# Patient Record
Sex: FEMALE | Race: WHITE | NOT HISPANIC OR LATINO | Employment: FULL TIME | URBAN - METROPOLITAN AREA
[De-identification: names, ages, dates, MRNs, and addresses within clinical notes are randomized per-mention and may not be internally consistent; named-entity substitution may affect disease eponyms.]

---

## 2020-07-16 ENCOUNTER — LAB VISIT (OUTPATIENT)
Dept: PRIMARY CARE CLINIC | Facility: CLINIC | Age: 45
End: 2020-07-16

## 2020-07-16 DIAGNOSIS — Z00.6 RESEARCH STUDY PATIENT: ICD-10-CM

## 2020-07-16 DIAGNOSIS — Z00.6 RESEARCH STUDY PATIENT: Primary | ICD-10-CM

## 2020-07-16 PROCEDURE — U0003 INFECTIOUS AGENT DETECTION BY NUCLEIC ACID (DNA OR RNA); SEVERE ACUTE RESPIRATORY SYNDROME CORONAVIRUS 2 (SARS-COV-2) (CORONAVIRUS DISEASE [COVID-19]), AMPLIFIED PROBE TECHNIQUE, MAKING USE OF HIGH THROUGHPUT TECHNOLOGIES AS DESCRIBED BY CMS-2020-01-R: HCPCS

## 2020-07-16 PROCEDURE — 86769 SARS-COV-2 COVID-19 ANTIBODY: CPT

## 2020-07-16 NOTE — RESEARCH
Date of Consent: 7/16/2020    Sponsor: [facility]    Study Title/IRB Number: Observational study of Sars-CoV2 Immunoglobulin G (IgG) seroprevalence among the [facility] population over time 2020.163  Principle Investigator: Karlie Cabrera, PhD    Did the patient need translation services? No   name: N/A    Prior to the Informed Consent (IC) being signed, or any study protocol required data collection, testing, procedure, or intervention being performed, the following was done and/or discussed:   Patient was given a paper copy of the IC for review    Patient was given study FAQ   Purpose of the study and qualifications to participate    Study design and tests or procedures done at this visit   Confidentiality and HIPAA Authorization for Release of Medical Records for the research trial/ subject's rights/research related injury   Risk, Benefits, Alternative Treatments, Compensation and Costs   Participation in the research trial is voluntary and patient may withdraw at anytime   Contact information for study related questions    Patient verbalizes understanding of the above: Yes  Contact information for PI and IRB given to patient: Yes  Patient able to adequately summarize: the purpose of the study, the risks associated with the study, and all procedures, testing, and follow-ups associated with the study: Yes    The consent was discussed verbally with the patient and all questions were answered satisfactorily. Patient gave verbal consent for the Seroprevalence research study with an IRB approval date of 05/08/2020.      The Consent, Consent Witness and name of Clinical Research Coordinator consenting was captured and documented in REDCap.    All Inclusion and Exclusion Criteria reviewed, subject meets all Inclusion criteria and does not meet any Exclusion Criteria at this time.     Patient Eligibility was confirmed.    Patient responded to survey questions.    The following biospecimen  collection procedures were collected:    -Nasopharyngeal Swab Collection  -Blood collection

## 2020-07-17 LAB — SARS-COV-2 IGG SERPLBLD QL IA.RAPID: NEGATIVE

## 2020-07-19 LAB — SARS-COV-2 RNA RESP QL NAA+PROBE: NOT DETECTED

## 2023-02-22 PROBLEM — N60.82 FLAT EPITHELIAL ATYPIA (FEA) OF LEFT BREAST: Status: ACTIVE | Noted: 2023-02-22

## 2023-02-22 PROBLEM — Z80.3 FAMILY HISTORY OF MALIGNANT NEOPLASM OF BREAST: Status: ACTIVE | Noted: 2023-02-22

## 2023-02-22 PROBLEM — N60.92 ATYPICAL LOBULAR HYPERPLASIA (ALH) OF LEFT BREAST: Status: ACTIVE | Noted: 2023-02-22

## 2023-02-23 NOTE — PROGRESS NOTES
[facility]  MD Bill Morel, NP-C    Chief Complaint:   Velma Elena is a 48 y.o. female presenting today for her 6 month follow up as part of our High-Risk Clinic.. She is due for Mammogram.  She reports no interval changes.     History of Present Illness:   Mrs. Elena first presented on July 15, 2021 after her mammogram demonstrated an area of suspicious microcalcifications in the left breast. Stereotactic biopsy showed flat epithelial atypia and excision was recommended. Excisonal biopsy showed atypical lobular hyperplasia but nothing cancerous. MD::: Mercedes Renee MD.    Past Medical History:   Diagnosis Date    Atypical lobular hyperplasia (ALH) of left breast 2023    Long discussion with the patient concerning her diagnosis of Atypical Lobular Hyperplasia and the risk that it carries over her lifetime. Currently, the risk of developing a breast cancer is thought to be as high as 7% over the first 5 years. She understands the need to be followed more aggressively in our [facility] every 6 months and is comfortable with this plan.    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  Researchers are still working to understand if there is a true connection between FEA and breast cancer.  More research is needed.      Past Surgical History:   Procedure Laterality Date    BREAST BIOPSY Left     atypical lobular hyperplasia      SECTION      ENDOMETRIAL ABLATION  2022        Current Outpatient Medications:     ALPRAZolam (XANAX) 0.25 MG tablet, 1/2 by mouth three times daily as needed for anxiety, Disp: , Rfl:     pantoprazole (PROTONIX) 40 MG tablet, Take 40 mg by mouth every morning., Disp: , Rfl:     sertraline (ZOLOFT) 50 MG tablet, 1/2 by mouth daily for 2 weeks then 1daily by  mouth, Disp: , Rfl:    Review of patient's allergies indicates:   Allergen Reactions    Ranitidine hcl      NOT EFFECTIVE    Sulfa (sulfonamide antibiotics)      As a baby      Social History     Tobacco Use    Smoking status: Never    Smokeless tobacco: Never   Substance Use Topics    Alcohol use: Not on file      Family History   Problem Relation Age of Onset    Breast cancer Maternal Aunt     Breast cancer Maternal Grandmother       Review of Systems   Genitourinary:  Negative for hot flashes.   Integumentary:  Negative for color change, rash, mole/lesion, breast mass, breast discharge and breast tenderness.   Breast: Negative for mass and tenderness   Physical Exam  Chest:   Breasts:     Right: No mass, nipple discharge, skin change or tenderness.      Left: No mass, nipple discharge, skin change or tenderness.   Lymphadenopathy:      Upper Body:      Right upper body: No axillary adenopathy.      Left upper body: No axillary adenopathy.        MAMMOGRAM REPORT: She has some diffuse fibronodular tissue bilaterally; there are no spiculated lesions, distortions or suspicious calcifications noted; NEM     1. Atypical lobular hyperplasia (ALH) of left breast  Overview:  Long discussion with the patient concerning her diagnosis of Atypical Lobular Hyperplasia and the risk that it carries over her lifetime. Currently, the risk of developing a breast cancer is thought to be as high as 7% over the first 5 years. She understands the need to be followed more aggressively in our [facility] every 6 months and is comfortable with this plan.      2. 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 Although historically thought to be a high-risk lesion, this has become controversial.  Researchers are still working to understand if there is a true connection between FEA and breast cancer.  More  research is needed.          3. Family history of malignant neoplasm of breast  Overview:  We discussed her family history and how it could impact her own future risks.  We discussed family vs. genetic history and the importance of each.  Genetic Counseling/Testing was offered, and all questions answered.                 Medical Decision Making:  It is my impression that this patient suffers all conditions contained in this medical document.  Each of these conditions did affect our plan of care and my medical decision making today.  It is my opinion that the medical decision making concerning this patient was of minimal  difficulty based on the aforementioned conditions.  Any further recommendations will be communicated to the patient by me.  I have reviewed and verified her allergies, list of medications, medical and surgical histories, social history, and a pertinent review of symptoms.        Follow up:  6 months and prn    For:  PE and US with me

## 2023-03-06 ENCOUNTER — OFFICE VISIT (OUTPATIENT)
Dept: SURGERY | Facility: CLINIC | Age: 48
End: 2023-03-06
Payer: COMMERCIAL

## 2023-03-06 DIAGNOSIS — N60.82 FLAT EPITHELIAL ATYPIA (FEA) OF LEFT BREAST: ICD-10-CM

## 2023-03-06 DIAGNOSIS — N60.92 ATYPICAL LOBULAR HYPERPLASIA (ALH) OF LEFT BREAST: Primary | ICD-10-CM

## 2023-03-06 DIAGNOSIS — Z80.3 FAMILY HISTORY OF MALIGNANT NEOPLASM OF BREAST: ICD-10-CM

## 2023-03-06 PROCEDURE — 99214 PR OFFICE/OUTPT VISIT, EST, LEVL IV, 30-39 MIN: ICD-10-PCS | Mod: S$GLB,,, | Performed by: SPECIALIST

## 2023-03-06 PROCEDURE — 99214 OFFICE O/P EST MOD 30 MIN: CPT | Mod: S$GLB,,, | Performed by: SPECIALIST

## 2023-03-06 PROCEDURE — 1160F PR REVIEW ALL MEDS BY PRESCRIBER/CLIN PHARMACIST DOCUMENTED: ICD-10-PCS | Mod: CPTII,S$GLB,, | Performed by: SPECIALIST

## 2023-03-06 PROCEDURE — 1160F RVW MEDS BY RX/DR IN RCRD: CPT | Mod: CPTII,S$GLB,, | Performed by: SPECIALIST

## 2023-03-06 PROCEDURE — 1159F PR MEDICATION LIST DOCUMENTED IN MEDICAL RECORD: ICD-10-PCS | Mod: CPTII,S$GLB,, | Performed by: SPECIALIST

## 2023-03-06 PROCEDURE — 1159F MED LIST DOCD IN RCRD: CPT | Mod: CPTII,S$GLB,, | Performed by: SPECIALIST

## 2023-08-28 NOTE — ASSESSMENT & PLAN NOTE
Long discussion with the patient concerning her diagnosis of Flat Epithelial Atypia and the risk that it can carry over her lifetime - which can be up to 4 times the normal population risk (given historical data).  Due to this, we recommended that patients with FEA be seen twice per year in the high-risk setting.  While treating as a high-risk lesion is the current standard of care - this has become controversial as of recent.       Researchers are still working to understand if there is a true connection between FEA and breast cancer.  More research is needed before treating FEA as a benign entity as FEA can be found adjacent to more aggressive and cancerous lesions.

## 2023-08-28 NOTE — PROGRESS NOTES
[facility]  David Garcia MD, FACS  Bill Hung NP-C      Date of Service: 2023    Chief Complaint:   Velma Elena is a 48 y.o. female presenting today for her 6-month evaluation given her diagnosis of Atypical Lobular Hyperplasia. She is due for an ultrasound.  She reports no interval changes.     History of Present Illness:   Mrs. Elena first presented on July 15, 2021 after her mammogram demonstrated an area of suspicious microcalcifications in the left breast. Stereotactic biopsy showed flat epithelial atypia and excision was recommended. Excisonal biopsy showed atypical lobular hyperplasia but nothing cancerous. MD::: Mercedes Renee MD.    Past Medical History:   Diagnosis Date    Atypical lobular hyperplasia (ALH) of left breast 2023    Long discussion with the patient concerning her diagnosis of Atypical Lobular Hyperplasia and the risk that it carries over her lifetime. Currently, the risk of developing a breast cancer is thought to be as high as 7% over the first 5 years. She understands the need to be followed more aggressively in our [facility] every 6 months and is comfortable with this plan.    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  Researchers are still working to understand if there is a true connection between FEA and breast cancer.  More research is needed.      Past Surgical History:   Procedure Laterality Date    BREAST BIOPSY Left     atypical lobular hyperplasia      SECTION      ENDOMETRIAL ABLATION  2022        Current Outpatient Medications:     ALPRAZolam (XANAX) 0.25 MG tablet, 1/2 by mouth three times daily as needed for anxiety, Disp: , Rfl:     pantoprazole (PROTONIX) 40 MG tablet, Take 40 mg by mouth every morning., Disp: , Rfl:     sertraline  (ZOLOFT) 50 MG tablet, 1/2 by mouth daily for 2 weeks then 1daily by mouth, Disp: , Rfl:    Review of patient's allergies indicates:   Allergen Reactions    Ranitidine hcl      NOT EFFECTIVE    Sulfa (sulfonamide antibiotics)      As a baby      Social History     Tobacco Use    Smoking status: Never    Smokeless tobacco: Never   Substance Use Topics    Alcohol use: Not on file      Family History   Problem Relation Age of Onset    Breast cancer Maternal Grandmother     Breast cancer Mother     Breast cancer Maternal Aunt         Review of Systems   Integumentary:  Negative for color change, rash, mole/lesion, breast mass, breast discharge and breast tenderness.   Breast: Negative for mass and tenderness       Physical Exam   HENT:   Head: Normocephalic.   Pulmonary/Chest: Right breast exhibits no inverted nipple, no mass, no nipple discharge, no skin change and no tenderness. Left breast exhibits no inverted nipple, no mass, no nipple discharge, no skin change and no tenderness. No breast swelling.   Genitourinary: No breast swelling.   Musculoskeletal: Lymphadenopathy:      Upper Body:      Right upper body: No supraclavicular or axillary adenopathy.      Left upper body: No supraclavicular or axillary adenopathy.     Neurological: She is alert.        ULTRASOUND EVALUATION and REPORT    Bilateral real-time Ultrasound was performed by me.  All four quadrants of the breast, the subareolar and axillary basins were scanned.    She has some heterogeneous dense fibroglandular tissue bilaterally.    Right Breast: She has normal tissues in the right breast; there's no disruption of the tissue planes and no abnormal shadowing; she has normal skin thickness with no subcutaneous nodules of skin thickening; NEM     Left Breast: She has normal tissues in the left breast; there's no disruption of the tissue planes and no abnormal shadowing; she has normal skin thickness with no subcutaneous nodules or skin thickening; NEM      Axillae: There are no abnormal lymph nodes seen bilaterally.     Impression: Some dense but normal tissue bilaterally with no solid/suspicious masses noted. No LAD in bilateral axillae.  BIRADS: Category 2 - Benign Finding.    Findings were discussed with patient in real time and a hand written report was given to her at the conclusion of the exam.      ASSESSMENT and PLAN OF CARE     1. Atypical lobular hyperplasia (ALH) of left breast  Assessment & Plan:  Long discussion with the patient concerning her diagnosis of Atypical Lobular Hyperplasia and the risk that it carries over her lifetime. Currently, the risk of developing a breast cancer is thought to be as high as 7% over the first 5 years.  Lifetime risks are more difficult to determine, but she understands that her risk is higher than others without a diagnosis of atypia.  She understands the need to be followed more aggressively in our [facility] every 6 months and is comfortable with this plan.        2. 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  Due to this, we recommended that patients with FEA be seen twice per year in the high-risk setting.  While treating as a high-risk lesion is the current standard of care - this has become controversial as of recent.       Researchers are still working to understand if there is a true connection between FEA and breast cancer.  More research is needed before treating FEA as a benign entity as FEA can be found adjacent to more aggressive and cancerous lesions.        3. Family history of malignant neoplasm of breast  Assessment & Plan:  We discussed her family history and how it could impact her own future risks.  We discussed family vs. genetic history and the importance and implications  of each.  Genetic Counseling/Testing was offered, and all questions answered to her satisfaction.  She knows that as additional family members are diagnosed - she will need to let us know as this may change follow up and imaging recommendations.    We had a discussion concerning Breast Cancer Risk Reduction and current NCCN Guidelines. She knows that her risk can be lowered slightly with a healthy lifestyle and minimal ETOH use. Being physically active will also help. She should reduce or stay away from OCPs and HRT as possible.         Medical Decision Making: It is my impression that the patient suffers from all the listed conditions.  Each of these conditions did affect my Plan of Care and all medical decisions that were rendered.  The medical decision making was of high difficulty based on her comorbidities and her previous diagnosis of Atypical Lobular Hyperplasia, which puts her in a high-risk category for the future risk of breast cancer.  I have reviewed all imaging and it has been discussed with her. I have reviewed and verified her allergies, list of medications, medical and surgical histories, social history and a pertinent review of symptoms.    Follow up: 6 months and prn     For:  VAHID SAMUEL (GOLD[facility]) at [facility]

## 2023-08-28 NOTE — ASSESSMENT & PLAN NOTE
Long discussion with the patient concerning her diagnosis of Atypical Lobular Hyperplasia and the risk that it carries over her lifetime. Currently, the risk of developing a breast cancer is thought to be as high as 7% over the first 5 years.  Lifetime risks are more difficult to determine, but she understands that her risk is higher than others without a diagnosis of atypia.  She understands the need to be followed more aggressively in our [facility] every 6 months and is comfortable with this plan.

## 2023-09-11 ENCOUNTER — OFFICE VISIT (OUTPATIENT)
Dept: SURGERY | Facility: CLINIC | Age: 48
End: 2023-09-11
Payer: COMMERCIAL

## 2023-09-11 DIAGNOSIS — Z80.3 FAMILY HISTORY OF MALIGNANT NEOPLASM OF BREAST: ICD-10-CM

## 2023-09-11 DIAGNOSIS — N60.92 ATYPICAL LOBULAR HYPERPLASIA (ALH) OF LEFT BREAST: Primary | ICD-10-CM

## 2023-09-11 DIAGNOSIS — N60.82 FLAT EPITHELIAL ATYPIA (FEA) OF LEFT BREAST: ICD-10-CM

## 2023-09-11 PROCEDURE — 1160F PR REVIEW ALL MEDS BY PRESCRIBER/CLIN PHARMACIST DOCUMENTED: ICD-10-PCS | Mod: CPTII,S$GLB,, | Performed by: SPECIALIST

## 2023-09-11 PROCEDURE — 1159F MED LIST DOCD IN RCRD: CPT | Mod: CPTII,S$GLB,, | Performed by: SPECIALIST

## 2023-09-11 PROCEDURE — 99214 PR OFFICE/OUTPT VISIT, EST, LEVL IV, 30-39 MIN: ICD-10-PCS | Mod: S$GLB,,, | Performed by: SPECIALIST

## 2023-09-11 PROCEDURE — 1160F RVW MEDS BY RX/DR IN RCRD: CPT | Mod: CPTII,S$GLB,, | Performed by: SPECIALIST

## 2023-09-11 PROCEDURE — 99214 OFFICE O/P EST MOD 30 MIN: CPT | Mod: S$GLB,,, | Performed by: SPECIALIST

## 2023-09-11 PROCEDURE — 1159F PR MEDICATION LIST DOCUMENTED IN MEDICAL RECORD: ICD-10-PCS | Mod: CPTII,S$GLB,, | Performed by: SPECIALIST

## 2024-04-07 DIAGNOSIS — N60.82 FLAT EPITHELIAL ATYPIA (FEA) OF LEFT BREAST: ICD-10-CM

## 2024-04-07 DIAGNOSIS — N60.92 ATYPICAL LOBULAR HYPERPLASIA (ALH) OF LEFT BREAST: Primary | ICD-10-CM

## 2024-04-17 NOTE — PROGRESS NOTES
[facility]  David Garcia MD, FACS  Bill Hung NP-C      Date of Service: 2024    Chief Complaint:   Velma Elena is a 49 y.o. female presenting today for her 6 month evaluation given her diagnosis of Atypical Lobular Hyperplasia and Flat epithelial atypia.  She is due for her annual mammogram.  She reports no interval changes.     History of Present Illness:   Mrs. Elena first presented on July 15, 2021 after her mammogram demonstrated an area of suspicious microcalcifications in the left breast. Stereotactic biopsy showed flat epithelial atypia and excision was recommended. Excisonal biopsy showed atypical lobular hyperplasia but nothing cancerous. MD::: Mercedes Renee MD.       Past Medical History:   Diagnosis Date    Atypical lobular hyperplasia (ALH) of left breast 2023    Long discussion with the patient concerning her diagnosis of Atypical Lobular Hyperplasia and the risk that it carries over her lifetime. Currently, the risk of developing a breast cancer is thought to be as high as 7% over the first 5 years. She understands the need to be followed more aggressively in our [facility] every 6 months and is comfortable with this plan.    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  Researchers are still working to understand if there is a true connection between FEA and breast cancer.  More research is needed.      Past Surgical History:   Procedure Laterality Date    BREAST BIOPSY Left     atypical lobular hyperplasia      SECTION      ENDOMETRIAL ABLATION  2022        Current Outpatient Medications:     ALPRAZolam (XANAX) 0.25 MG tablet, 1/2 by mouth three times daily as needed for anxiety, Disp: , Rfl:     pantoprazole (PROTONIX) 40 MG tablet, Take 40 mg by mouth every  morning., Disp: , Rfl:     sertraline (ZOLOFT) 50 MG tablet, 1/2 by mouth daily for 2 weeks then 1daily by mouth, Disp: , Rfl:    Review of patient's allergies indicates:   Allergen Reactions    Ranitidine hcl      NOT EFFECTIVE    Sulfa (sulfonamide antibiotics)      As a baby      Social History     Tobacco Use    Smoking status: Never    Smokeless tobacco: Never   Substance Use Topics    Alcohol use: Not on file      Family History   Problem Relation Name Age of Onset    Breast cancer Maternal Grandmother      Breast cancer Mother      Breast cancer Maternal Aunt          Review of Systems   Integumentary:  Negative for color change, rash, mole/lesion, breast mass, breast discharge and breast tenderness.   Breast: Negative for mass and tenderness       Physical Exam   HENT:   Head: Normocephalic.   Pulmonary/Chest: Right breast exhibits no inverted nipple, no mass, no nipple discharge, no skin change and no tenderness. Left breast exhibits no inverted nipple, no mass, no nipple discharge, no skin change and no tenderness. No breast swelling.   Genitourinary: No breast swelling.   Musculoskeletal: Lymphadenopathy:      Upper Body:      Right upper body: No supraclavicular or axillary adenopathy.      Left upper body: No supraclavicular or axillary adenopathy.     Neurological: She is alert.        MAMMOGRAM REPORT: She has some diffuse fibronodular tissue; there are no spiculated lesions, distortions or suspicious calcifications noted; NEM    ASSESSMENT and PLAN OF CARE     1. Atypical lobular hyperplasia (ALH) of left breast  Assessment & Plan:  Long discussion with the patient concerning her diagnosis of Atypical Lobular Hyperplasia and the risk that it carries over her lifetime. Currently, the risk of developing a breast cancer is thought to be as high as 7% over the first 5 years.  Lifetime risks are more difficult to determine, but she understands that her risk is higher than others without a diagnosis of  atypia.  She understands the need to be followed more aggressively in our [facility] every 6 months and is comfortable with this plan.        2. 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  Due to this, we recommended that patients with FEA be seen twice per year in the high-risk setting.  While treating as a high-risk lesion is the current standard of care - this has become controversial as of recent.       Researchers are still working to understand if there is a true connection between FEA and breast cancer.  More research is needed before treating FEA as a benign entity as FEA can be found adjacent to more aggressive and cancerous lesions.        3. Family history of malignant neoplasm of breast  Assessment & Plan:  We discussed her family history and how it could impact her own future risks.  We discussed family vs. genetic history and the importance and implications of each.  All questions answered to her satisfaction.  She knows that as additional family members are diagnosed - she will need to let us know as this may change follow up and imaging recommendations.    We had a discussion concerning Breast Cancer Risk Reduction and current NCCN Guidelines. She knows that her risk can be lowered slightly with a healthy lifestyle and minimal ETOH use. Being physically active will also help. She should reduce or stay away from OCPs and HRT as possible.         Medical Decision Making: It is my impression that the patient suffers from all the listed conditions.  Each of these conditions did affect my Plan of Care and all medical decisions that were rendered.  The medical decision making was of high difficulty based on her comorbidities and her previous diagnosis of Atypical Ductal  Hyperplasia, which puts her in a high-risk category for the future risk of breast cancer.  I have reviewed all imaging and it has been discussed with her. I have reviewed and verified her allergies, list of medications, medical and surgical histories, social history and a pertinent review of symptoms.    Follow up:  6 months and prn    For:  PE and US with Dr. Garcia

## 2024-04-19 ENCOUNTER — OFFICE VISIT (OUTPATIENT)
Dept: SURGERY | Facility: CLINIC | Age: 49
End: 2024-04-19
Payer: COMMERCIAL

## 2024-04-19 DIAGNOSIS — N60.92 ATYPICAL LOBULAR HYPERPLASIA (ALH) OF LEFT BREAST: Primary | ICD-10-CM

## 2024-04-19 DIAGNOSIS — N60.82 FLAT EPITHELIAL ATYPIA (FEA) OF LEFT BREAST: ICD-10-CM

## 2024-04-19 DIAGNOSIS — Z80.3 FAMILY HISTORY OF MALIGNANT NEOPLASM OF BREAST: ICD-10-CM

## 2024-04-19 PROCEDURE — 1159F MED LIST DOCD IN RCRD: CPT | Mod: CPTII,S$GLB,, | Performed by: NURSE PRACTITIONER

## 2024-04-19 PROCEDURE — 99999 PR PBB SHADOW E&M-EST. PATIENT-LVL II: CPT | Mod: PBBFAC,,, | Performed by: NURSE PRACTITIONER

## 2024-04-19 PROCEDURE — 1160F RVW MEDS BY RX/DR IN RCRD: CPT | Mod: CPTII,S$GLB,, | Performed by: NURSE PRACTITIONER

## 2024-04-19 PROCEDURE — 99213 OFFICE O/P EST LOW 20 MIN: CPT | Mod: S$GLB,,, | Performed by: NURSE PRACTITIONER

## 2024-05-08 ENCOUNTER — TELEPHONE (OUTPATIENT)
Dept: SURGERY | Facility: CLINIC | Age: 49
End: 2024-05-08
Payer: COMMERCIAL

## 2024-10-02 NOTE — PROGRESS NOTES
Date of Service: 10/23/2024    Chief Complaint:   Velma Elena is a 49 y.o. female presenting today for her 6-month evaluation given her diagnosis of Atypical Lobular Hyperplasia. She is due for an ultrasound.  She reports no interval changes.     History of Present Illness:   Mrs. Elena first presented on July 15, 2021 after her mammogram demonstrated an area of suspicious microcalcifications in the left breast. Stereotactic biopsy showed flat epithelial atypia and excision was recommended. Excisonal biopsy showed atypical lobular hyperplasia but nothing cancerous. MD::: Mercedes Renee MD.    Past Medical History:   Diagnosis Date    Atypical lobular hyperplasia (ALH) of left breast 2023    Long discussion with the patient concerning her diagnosis of Atypical Lobular Hyperplasia and the risk that it carries over her lifetime. Currently, the risk of developing a breast cancer is thought to be as high as 7% over the first 5 years. She understands the need to be followed more aggressively in our [facility] every 6 months and is comfortable with this plan.    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  Researchers are still working to understand if there is a true connection between FEA and breast cancer.  More research is needed.      Past Surgical History:   Procedure Laterality Date    BREAST BIOPSY Left     atypical lobular hyperplasia      SECTION      ENDOMETRIAL ABLATION  2022        Current Outpatient Medications:     ALPRAZolam (XANAX) 0.25 MG tablet, 1/2 by mouth three times daily as needed for anxiety, Disp: , Rfl:     pantoprazole (PROTONIX) 40 MG tablet, Take 40 mg by mouth every morning., Disp: , Rfl:     sertraline (ZOLOFT) 50 MG tablet, 1/2 by mouth daily for 2 weeks then 1daily by mouth, Disp: , Rfl:    Review of  patient's allergies indicates:   Allergen Reactions    Ranitidine hcl      NOT EFFECTIVE    Sulfa (sulfonamide antibiotics)      As a baby      Social History     Tobacco Use    Smoking status: Never    Smokeless tobacco: Never   Substance Use Topics    Alcohol use: Not on file      Family History   Problem Relation Name Age of Onset    Breast cancer Maternal Grandmother      Breast cancer Mother      Breast cancer Maternal Aunt          Review of Systems   Integumentary:  Negative for color change, rash, mole/lesion, thickening/swelling, dimpling of skin, drainage  Breast: Negative for mass and tenderness     Physical Exam   Constitutional: She appears well-developed. She is cooperative.   HENT: Normocephalic.   Cardiovascular:  Normal rate and regular rhythm.            Pulmonary/Chest: She exhibits no tenderness and no bony tenderness.   Abdominal: Soft. Normal appearance.   Musculoskeletal: Intact with no deficits  Neurological: She is alert.   Skin: No rash noted.   Breast and Chest Wall Evaluation:   Right breast exhibits no mass, no nipple discharge, no skin change and no tenderness.     Left breast exhibits no mass, no nipple discharge, no skin change and no tenderness.     Lymphadenopathy: No supraclavicular or axillary adenopathy.    ULTRASOUND EVALUATION and REPORT    Bilateral real-time Ultrasound was performed by me.  All four quadrants of the breast, the subareolar and axillary basins were scanned.    She has some heterogeneous dense fibroglandular tissue bilaterally.    Right Breast: She has normal tissues in the right breast; there's no disruption of the tissue planes and no abnormal shadowing; she has normal skin thickness with no subcutaneous nodules of skin thickening; NEM     Left Breast: She has normal tissues in the left breast; there's no disruption of the tissue planes and no abnormal shadowing; she has normal skin thickness with no subcutaneous nodules or skin thickening; NEM     Axillae:  There are no abnormal lymph nodes seen bilaterally.     Impression: Some dense but normal tissue bilaterally with no solid/suspicious masses noted. No LAD in bilateral axillae.  BIRADS: Category 2 - Benign Finding.    Findings were discussed with patient in real time and a hand written report was given to her at the conclusion of the exam.      ASSESSMENT and PLAN OF CARE     1. Atypical lobular hyperplasia (ALH) of left breast  Assessment & Plan:  Long discussion with the patient concerning her diagnosis of Atypical Lobular Hyperplasia and the risk that it carries over her lifetime. Currently, the risk of developing a breast cancer is thought to be as high as 7% over the first 5 years.  Lifetime risks are more difficult to determine, but she understands that her risk is higher than others without a diagnosis of atypia.  She understands the need to be followed more aggressively in our [facility] every 6 months and is comfortable with this plan.        2. 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  Due to this, we recommended that patients with FEA be seen twice per year in the high-risk setting.  While treating as a high-risk lesion is the current standard of care - this has become controversial as of recent.       Researchers are still working to understand if there is a true connection between FEA and breast cancer.  More research is needed before treating FEA as a benign entity as FEA can be found adjacent to more aggressive and cancerous lesions.        3. Family history of malignant neoplasm of breast  Assessment & Plan:  We discussed her family history and how it could impact her own future risks.  We discussed family vs. genetic history and the importance and implications of each.   All questions answered to her satisfaction.  She knows that as additional family members are diagnosed - she will need to let us know as this may change follow up and imaging recommendations.    We had a discussion concerning Breast Cancer Risk Reduction and current NCCN Guidelines. She knows that her risk can be lowered slightly with a healthy lifestyle and minimal ETOH use. Being physically active will also help. She should reduce or stay away from OCPs and HRT as possible.         Medical Decision Making: It is my impression that the patient suffers from all the listed conditions.  Each of these conditions did affect my Plan of Care and all medical decisions that were rendered.  The medical decision making was of high difficulty based on her comorbidities and her previous diagnosis of Atypical Lobular Hyperplasia, which puts her in a high-risk category for the future risk of breast cancer.  I have reviewed all imaging and it has been discussed with her. I have reviewed and verified her allergies, list of medications, medical and surgical histories, social history and a pertinent review of symptoms.    Follow up: 6 months and prn     For:  VAHID SAMUEL (GOLDEN) at [facility]

## 2024-10-23 ENCOUNTER — OFFICE VISIT (OUTPATIENT)
Dept: SURGERY | Facility: CLINIC | Age: 49
End: 2024-10-23
Payer: COMMERCIAL

## 2024-10-23 DIAGNOSIS — N60.82 FLAT EPITHELIAL ATYPIA (FEA) OF LEFT BREAST: ICD-10-CM

## 2024-10-23 DIAGNOSIS — N60.92 ATYPICAL LOBULAR HYPERPLASIA (ALH) OF LEFT BREAST: Primary | ICD-10-CM

## 2024-10-23 DIAGNOSIS — Z80.3 FAMILY HISTORY OF MALIGNANT NEOPLASM OF BREAST: ICD-10-CM

## 2024-10-23 PROCEDURE — 1160F RVW MEDS BY RX/DR IN RCRD: CPT | Mod: CPTII,S$GLB,, | Performed by: SPECIALIST

## 2024-10-23 PROCEDURE — 99999 PR PBB SHADOW E&M-EST. PATIENT-LVL II: CPT | Mod: PBBFAC,,, | Performed by: SPECIALIST

## 2024-10-23 PROCEDURE — 99214 OFFICE O/P EST MOD 30 MIN: CPT | Mod: S$GLB,,, | Performed by: SPECIALIST

## 2024-10-23 PROCEDURE — 1159F MED LIST DOCD IN RCRD: CPT | Mod: CPTII,S$GLB,, | Performed by: SPECIALIST
